# Patient Record
Sex: FEMALE | Race: WHITE | NOT HISPANIC OR LATINO | URBAN - METROPOLITAN AREA
[De-identification: names, ages, dates, MRNs, and addresses within clinical notes are randomized per-mention and may not be internally consistent; named-entity substitution may affect disease eponyms.]

---

## 2017-10-17 ENCOUNTER — OFFICE (OUTPATIENT)
Dept: URBAN - METROPOLITAN AREA CLINIC 10 | Facility: CLINIC | Age: 42
End: 2017-10-17

## 2017-10-17 VITALS
WEIGHT: 273 LBS | HEART RATE: 85 BPM | DIASTOLIC BLOOD PRESSURE: 89 MMHG | HEIGHT: 65 IN | SYSTOLIC BLOOD PRESSURE: 157 MMHG

## 2017-10-17 DIAGNOSIS — N39.0 URINARY TRACT INFECTION, SITE NOT SPECIFIED: ICD-10-CM

## 2017-10-17 DIAGNOSIS — E66.01 MORBID (SEVERE) OBESITY DUE TO EXCESS CALORIES: ICD-10-CM

## 2017-10-17 DIAGNOSIS — K92.1 MELENA: ICD-10-CM

## 2017-10-17 DIAGNOSIS — K58.2 MIXED IRRITABLE BOWEL SYNDROME: ICD-10-CM

## 2017-10-17 PROCEDURE — 99204 OFFICE O/P NEW MOD 45 MIN: CPT

## 2017-10-17 RX ORDER — PRAMOXINE HYDROCHLORIDE HYDROCORTISONE ACETATE 100; 100 MG/10G; MG/10G
AEROSOL, FOAM TOPICAL
Qty: 21 | Refills: 10 | Status: ACTIVE
Start: 2017-10-17

## 2017-10-17 NOTE — SERVICENOTES
Certainly if the diarrhea persists and is not just soft stool, could consider other medication as we had considered before such as Lotronex

## 2017-10-17 NOTE — SERVICEHPINOTES
Charmaine Moreno   is a  42   year old   female   who is here today for a follow-up visit. She was last seen here on  12/29/2014   for a  Colonoscopy  . She is in the office for chief complaint of rectal bleeding and loose stools, she has a history of hemorrhoids and noticed rectal bleeding about a month ago. Blood was noted with wiping. Bleeding stopped with hemorrhoid treatment. She also complains of loose stools and has had diarrhea for long time. Colonoscopy in 2014 showed diverticular disease the rest negative. States stools are occurring daily 2-3 times and soft stools. She was recently treated with Cipro and now on doxycycline for UTI. Nausea and low grade fever with UTI. NO abdominal pain besides bloating at this time.        ANITRANo recent travels outside the country.